# Patient Record
Sex: FEMALE | Race: BLACK OR AFRICAN AMERICAN | NOT HISPANIC OR LATINO
[De-identification: names, ages, dates, MRNs, and addresses within clinical notes are randomized per-mention and may not be internally consistent; named-entity substitution may affect disease eponyms.]

---

## 2021-06-30 ENCOUNTER — NON-APPOINTMENT (OUTPATIENT)
Age: 56
End: 2021-06-30

## 2021-06-30 PROBLEM — Z00.00 ENCOUNTER FOR PREVENTIVE HEALTH EXAMINATION: Status: ACTIVE | Noted: 2021-06-30

## 2021-09-16 ENCOUNTER — APPOINTMENT (OUTPATIENT)
Dept: OBGYN | Facility: CLINIC | Age: 56
End: 2021-09-16
Payer: COMMERCIAL

## 2021-09-16 ENCOUNTER — LABORATORY RESULT (OUTPATIENT)
Age: 56
End: 2021-09-16

## 2021-09-16 VITALS
HEIGHT: 65.5 IN | DIASTOLIC BLOOD PRESSURE: 64 MMHG | RESPIRATION RATE: 16 BRPM | SYSTOLIC BLOOD PRESSURE: 104 MMHG | BODY MASS INDEX: 29.46 KG/M2 | TEMPERATURE: 97.3 F | WEIGHT: 179 LBS

## 2021-09-16 DIAGNOSIS — Z78.9 OTHER SPECIFIED HEALTH STATUS: ICD-10-CM

## 2021-09-16 DIAGNOSIS — Z80.3 FAMILY HISTORY OF MALIGNANT NEOPLASM OF BREAST: ICD-10-CM

## 2021-09-16 DIAGNOSIS — Z78.0 ASYMPTOMATIC MENOPAUSAL STATE: ICD-10-CM

## 2021-09-16 PROCEDURE — 99386 PREV VISIT NEW AGE 40-64: CPT

## 2021-09-16 RX ORDER — CHROMIUM 200 MCG
TABLET ORAL
Refills: 0 | Status: ACTIVE | COMMUNITY

## 2021-09-16 NOTE — LETTER GREETING
[Dear  ___] : Dear  [unfilled], [FreeTextEntry1] : I had the pleasure of evaluating your patient, JOSE TORRES . Please see my summary of recommendations followed by my full note. \par \par Thank you for allowing me to participate in the care of this patient. If you have any questions, please do not hesitate to contact me.\par \par Sincerely,\par \par Samira Neal MD, FACOG \par [facility]\par Obstetrics and Gynecology in [place]\[address], [facility] [address]\[place] [address]\[facility] Phone: [phone] Fax: [phone]

## 2021-09-16 NOTE — HISTORY OF PRESENT ILLNESS
[Patient reported mammogram was normal] : Patient reported mammogram was normal [Patient reported PAP Smear was abnormal] : Patient reported PAP Smear was abnormal [Patient reported colonoscopy was normal] : Patient reported colonoscopy was normal [Y] : Patient is sexually active [Monogamous (Male Partner)] : is monogamous with a male partner [Mammogramdate] : 5/2020 [PapSmeardate] : 7/2020 [TextBox_31] : pt had hysterectomy [ColonoscopyDate] : 8/2020 [TextBox_43] : return in 5 yrs [LMPDate] : 40's [PGxTotal] : 11 [facility] : 1 [FreeTextEntry1] : NSVDx1. Denies cysts, fibroids, hx abnormal pap, denies STI

## 2021-09-16 NOTE — DISCUSSION/SUMMARY
[FreeTextEntry1] : 55 yo here for well woman exam. \par 1) Health maintenance: \par Vaginal Pap + HPV\par Mammogram referral\par Up to date on colonoscopy\par \par 2) s/p hysterectomy for cervical dysplasia:\par Op report and path report requested to review management plan for paps\par \par

## 2021-09-16 NOTE — PHYSICAL EXAM
[Appropriately responsive] : appropriately responsive [Alert] : alert [No Acute Distress] : no acute distress [No Lymphadenopathy] : no lymphadenopathy [Soft] : soft [Non-tender] : non-tender [Non-distended] : non-distended [No HSM] : No HSM [No Lesions] : no lesions [No Mass] : no mass [Oriented x3] : oriented x3 [Examination Of The Breasts] : a normal appearance [No Discharge] : no discharge [No Masses] : no breast masses were palpable [Labia Majora] : normal [Labia Minora] : normal [Normal] : normal [Absent] : absent [Uterine Adnexae] : normal

## 2021-09-16 NOTE — COUNSELING
[Nutrition/ Exercise/ Weight Management] : nutrition, exercise, weight management [Breast Self Exam] : breast self exam [Confidentiality] : confidentiality

## 2021-09-24 ENCOUNTER — NON-APPOINTMENT (OUTPATIENT)
Age: 56
End: 2021-09-24

## 2021-09-27 LAB — HPV HIGH+LOW RISK DNA PNL CVX: DETECTED

## 2021-10-19 ENCOUNTER — OUTPATIENT (OUTPATIENT)
Dept: OUTPATIENT SERVICES | Facility: HOSPITAL | Age: 56
LOS: 1 days | End: 2021-10-19
Payer: COMMERCIAL

## 2021-10-19 ENCOUNTER — APPOINTMENT (OUTPATIENT)
Dept: MAMMOGRAPHY | Facility: CLINIC | Age: 56
End: 2021-10-19
Payer: COMMERCIAL

## 2021-10-19 ENCOUNTER — RESULT REVIEW (OUTPATIENT)
Age: 56
End: 2021-10-19

## 2021-10-19 DIAGNOSIS — Z12.39 ENCOUNTER FOR OTHER SCREENING FOR MALIGNANT NEOPLASM OF BREAST: ICD-10-CM

## 2021-10-19 PROCEDURE — 77067 SCR MAMMO BI INCL CAD: CPT | Mod: 26

## 2021-10-19 PROCEDURE — 77063 BREAST TOMOSYNTHESIS BI: CPT | Mod: 26

## 2021-10-19 PROCEDURE — 77067 SCR MAMMO BI INCL CAD: CPT

## 2021-10-19 PROCEDURE — 77063 BREAST TOMOSYNTHESIS BI: CPT

## 2021-10-20 ENCOUNTER — TRANSCRIPTION ENCOUNTER (OUTPATIENT)
Age: 56
End: 2021-10-20

## 2021-12-10 ENCOUNTER — NON-APPOINTMENT (OUTPATIENT)
Age: 56
End: 2021-12-10

## 2022-03-17 ENCOUNTER — APPOINTMENT (OUTPATIENT)
Dept: OBGYN | Facility: CLINIC | Age: 57
End: 2022-03-17
Payer: COMMERCIAL

## 2022-03-17 ENCOUNTER — LABORATORY RESULT (OUTPATIENT)
Age: 57
End: 2022-03-17

## 2022-03-17 VITALS
SYSTOLIC BLOOD PRESSURE: 110 MMHG | HEIGHT: 65.5 IN | RESPIRATION RATE: 16 BRPM | WEIGHT: 172 LBS | DIASTOLIC BLOOD PRESSURE: 64 MMHG | BODY MASS INDEX: 28.31 KG/M2

## 2022-03-17 DIAGNOSIS — R87.810 CERVICAL HIGH RISK HUMAN PAPILLOMAVIRUS (HPV) DNA TEST POSITIVE: ICD-10-CM

## 2022-03-17 PROCEDURE — 57420 EXAM OF VAGINA W/SCOPE: CPT

## 2022-03-17 NOTE — HISTORY OF PRESENT ILLNESS
[FreeTextEntry1] : Pt here for colposcopy. 9/2021 Pap LGSIL cannot r/o HSIL in background of atrophic vaginitis. HPV positive, 16/18/45 negative. \par \par Hx of robotic hysterectomy 2020 for cervical dysplasia, prior records received. \par \par Mammogram negative. \par

## 2022-03-17 NOTE — DISCUSSION/SUMMARY
[FreeTextEntry1] : 55 yo with hx of cervical dysplasia s/p robotic hysterectomy 2020 now with abnormal vaginal pap 9/2021, now s/p colposcopy. \par -Repeat vaginal pap + HPV given interval from prior pap\par -Vaginal cuff biopsy to pathology\par -Pelvic rest x 2 wks\par -Return in 3 wks for follow-up/results

## 2022-03-17 NOTE — PROCEDURE
[Colposcopy] : Colposcopy  [Time out performed] : Pre-procedure time out performed.  Patient's name, date of birth and procedure confirmed. [Consent Obtained] : Consent obtained [Risks] : risks [Benefits] : benefits [Alternatives] : alternatives [Infection] : infection [Bleeding] : bleeding [Allergic Reaction] : allergic reaction [No Premedication] : no premedication [Pap Performed] : pap performed [No Abnormalities] : no abnormalities [Lesion] : lesion seen [Biopsy] : biopsy taken [Hemostasis Obtained] : Hemostasis obtained [Tolerated Well] : the patient tolerated the procedure well [de-identified] : LSIL cannot r/o HSIL [de-identified] : Repeat vaginal pap/HPV performed [de-identified] : faint AW lesion vaginal cuff midline [de-identified] : vaiginal cuff biopsy [de-identified] : cervix absent, pt s/p hysterectomy [de-identified] : silver nitrate [de-identified] : normal

## 2022-04-07 ENCOUNTER — APPOINTMENT (OUTPATIENT)
Dept: OBGYN | Facility: CLINIC | Age: 57
End: 2022-04-07
Payer: COMMERCIAL

## 2022-04-07 DIAGNOSIS — R87.622 LOW GRADE SQUAMOUS INTRAEPITHELIAL LESION ON CYTOLOGIC SMEAR OF VAGINA (LGSIL): ICD-10-CM

## 2022-04-07 PROCEDURE — 99213 OFFICE O/P EST LOW 20 MIN: CPT | Mod: 95

## 2022-04-08 PROBLEM — R87.622 PAP SMEAR ABNORMALITY OF VAGINA WITH LGSIL: Status: ACTIVE | Noted: 2022-03-17

## 2022-04-08 LAB — CYTOLOGY CVX/VAG DOC THIN PREP: ABNORMAL

## 2022-04-08 NOTE — DISCUSSION/SUMMARY
[FreeTextEntry1] : 57 yo with hx of CIN3 s/p robotic hysterectomy/BSO with persistent abnormal vaginal paps, recent colposcopy with bx insufficient. \par -Recommend referral to GYN Oncology for further evaluation and recommendations\par -She is considering her options and let us know what she decides

## 2022-04-08 NOTE — HISTORY OF PRESENT ILLNESS
[FreeTextEntry1] : Pt here for follow-up to review her results. 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 \par Prior records reviewd, hx of LEEP for CIN3, continued to have persistent high grade paps with no residual cervix. Underwent robot-assisted TLH/BSO 7/2020 with final pathology without any residual dysplasia. \par \par She continues to have abnormal high grade paps. I discussed recommendation for GYN Oncology for further evaluation. She declines at this time. She would like to review her findings with a family member and then will make her decision.

## 2022-04-08 NOTE — REASON FOR VISIT
[Other Location: e.g. School (Enter Location, City,State)___] : at [unfilled], at the time of the visit. [Medical Office: ([facility])___] : at the medical office located in  [Verbal consent obtained from patient] : the patient, [unfilled]

## 2022-04-10 LAB
CORE LAB BIOPSY: NORMAL
HPV HIGH+LOW RISK DNA PNL CVX: DETECTED

## 2023-07-14 ENCOUNTER — NON-APPOINTMENT (OUTPATIENT)
Age: 58
End: 2023-07-14

## 2023-08-07 ENCOUNTER — LABORATORY RESULT (OUTPATIENT)
Age: 58
End: 2023-08-07

## 2023-08-07 ENCOUNTER — APPOINTMENT (OUTPATIENT)
Dept: OBGYN | Facility: CLINIC | Age: 58
End: 2023-08-07
Payer: COMMERCIAL

## 2023-08-07 VITALS
BODY MASS INDEX: 29.99 KG/M2 | HEIGHT: 65 IN | SYSTOLIC BLOOD PRESSURE: 120 MMHG | WEIGHT: 180 LBS | DIASTOLIC BLOOD PRESSURE: 78 MMHG

## 2023-08-07 DIAGNOSIS — R85.611 ATYPICAL SQUAMOUS CELLS CANNOT EXCLUDE HIGH GRADE SQUAMOUS INTRAEPITHELIAL LESION ON CYTOLOGIC SMEAR OF ANUS (ASC-H): ICD-10-CM

## 2023-08-07 DIAGNOSIS — Z01.419 ENCOUNTER FOR GYNECOLOGICAL EXAMINATION (GENERAL) (ROUTINE) W/OUT ABNORMAL FINDINGS: ICD-10-CM

## 2023-08-07 DIAGNOSIS — Z12.39 ENCOUNTER FOR OTHER SCREENING FOR MALIGNANT NEOPLASM OF BREAST: ICD-10-CM

## 2023-08-07 PROCEDURE — 99396 PREV VISIT EST AGE 40-64: CPT

## 2023-08-07 NOTE — HISTORY OF PRESENT ILLNESS
[Patient reported mammogram was normal] : Patient reported mammogram was normal [Patient reported PAP Smear was abnormal] : Patient reported PAP Smear was abnormal [Patient reported colonoscopy was normal] : Patient reported colonoscopy was normal [Y] : Patient is sexually active [Monogamous (Male Partner)] : is monogamous with a male partner [TextBox_4] : 57 yo here for well woman exam. No complaints.  s/p hysterectomy Hx abnormal pap, last colposcopy 2022 vaginal bx with no epithelium. At that time, recommended GYN oncology consultation. Pt states she forgot. Discussed will do vaginal pap today and make management plan based on results.   [Mammogramdate] : 2022 [PapSmeardate] : 7/2020 [TextBox_31] : pt had hysterectomy [ColonoscopyDate] : 8/2020 [TextBox_43] : return in 5 yrs [LMPDate] : 40's [PGxTotal] : 11 [[facility]xF[facility]] : 1 [[facility] Corporation[facility]] : 1 [FreeTextEntry1] : NSVDx1. Denies cysts, fibroids, hx abnormal pap, denies STI

## 2023-08-07 NOTE — DISCUSSION/SUMMARY
[FreeTextEntry1] : 59 yo here for well woman exam.  1) Health maintenance:  Vaginal Pap + HPV Mammogram referral Up to date on colonoscopy  2) s/p hysterectomy for cervical dysplasia, last pap ASCH vaginal bx on colpo insufficient Pap + HPV today Will contact with results Management dependent on results

## 2023-08-07 NOTE — PHYSICAL EXAM
[Chaperone Present] : A chaperone was present in the examining room during all aspects of the physical examination [FreeTextEntry1] : RODRIGO Hernandez  [Appropriately responsive] : appropriately responsive [Alert] : alert [No Acute Distress] : no acute distress [Examination Of The Breasts] : a normal appearance [No Discharge] : no discharge [No Masses] : no breast masses were palpable [Labia Majora] : normal [Labia Minora] : normal [Normal] : normal [Absent] : absent [Uterine Adnexae] : normal

## 2023-08-10 LAB
CYTOLOGY CVX/VAG DOC THIN PREP: ABNORMAL
HPV HIGH+LOW RISK DNA PNL CVX: DETECTED

## 2023-08-23 RX ORDER — ACETAMINOPHEN 325 MG
TABLET ORAL
Refills: 0 | Status: ACTIVE | COMMUNITY

## 2023-08-23 RX ORDER — SPIRONOLACTONE 50 MG/1
TABLET ORAL
Refills: 0 | Status: ACTIVE | COMMUNITY

## 2023-08-23 RX ORDER — TRETINOIN 1 MG/G
CREAM TOPICAL
Refills: 0 | Status: DISCONTINUED | COMMUNITY
End: 2023-08-23

## 2023-08-24 ENCOUNTER — APPOINTMENT (OUTPATIENT)
Dept: GYNECOLOGIC ONCOLOGY | Facility: CLINIC | Age: 58
End: 2023-08-24
Payer: COMMERCIAL

## 2023-08-24 PROCEDURE — 99214 OFFICE O/P EST MOD 30 MIN: CPT | Mod: 25

## 2023-08-24 PROCEDURE — 57455 BIOPSY OF CERVIX W/SCOPE: CPT | Mod: 59

## 2023-08-24 PROCEDURE — 99204 OFFICE O/P NEW MOD 45 MIN: CPT | Mod: 25

## 2023-08-24 NOTE — PROCEDURE
[Colposcopy] : colposcopy [Abnormal Pap] : an abnormal pap smear [Patient] : the patient [Verbal Consent] : verbal consent was obtained prior to the procedure and is detailed in the patient's record [No Abnormalities] : no abnormalities [Biopsy Locations ___ o'clock] : the biopsies were taken at the [unfilled] o'clock position [Silver Nitrate] : silver nitrate [No Complications] : none [Tolerated Well] : the patient tolerated the procedure well [FreeTextEntry3] : acetowhite changes noted along vaginal cuff

## 2023-08-24 NOTE — ASSESSMENT
[FreeTextEntry1] : 59 y/o female with HPV+, 16/18/45 negative, ASCUS-H vaginal PAP. Vaginal colposcopy was done today with 9 oclock vaginal cuff biopsy due to friable tissue and acetowhite changes noted. I discussed with the patient that depending on biopsy results she may be a candidate for laser ablation therapy vs. repeat PAP in 1 year.

## 2023-08-24 NOTE — HISTORY OF PRESENT ILLNESS
[FreeTextEntry1] : 59 y/o  via  female, LMP 10 years ago being referred by Dr. Neal for evaluation of abnormal vaginal PAP. 8/10/23 PAP HPV +, 16/18/45 negative, ASC-H. Patient underwent colposcopy in  vaginal biopsy with no epithelium for HPV+, ASCH PAP. She is  RA [facility] BSO for cervical dysplasia 2020. Denies abnormal vaginal bleeding/discharge, abdominal pain/bloating/distension, pelvis discomfort, changes in normal bowel/urinary habits, nausea/vomiting, unintentional weight loss/gain, and all other associated signs and symptoms. She is not currently sexually active but when she previously was denies pain and/or post coital bleeding.     Health Screening: Last Mammogram: 2023; results pending as per patient Last Colonoscopy: ; normal as per patient  Last Dexa: Patient has not yet had

## 2023-08-24 NOTE — END OF VISIT
[FreeTextEntry3] : This note accurately reflects the work and decisions made by me. Written by Sue Holt PA-C acting as a scribe for Dr. Alfredo Naik.

## 2023-08-24 NOTE — CHIEF COMPLAINT
[FreeTextEntry1] : [place] Office  [facility] Gynecologic [facility] [phone] at [place] [phone]

## 2023-09-08 ENCOUNTER — APPOINTMENT (OUTPATIENT)
Dept: GYNECOLOGIC ONCOLOGY | Facility: CLINIC | Age: 58
End: 2023-09-08
Payer: COMMERCIAL

## 2023-09-08 DIAGNOSIS — R87.811 ATYPICAL SQUAMOUS CELLS OF UNDETERMINED SIGNIFICANCE ON CYTOLOGIC SMEAR OF VAGINA (ASC-US): ICD-10-CM

## 2023-09-08 DIAGNOSIS — R87.620 ATYPICAL SQUAMOUS CELLS OF UNDETERMINED SIGNIFICANCE ON CYTOLOGIC SMEAR OF VAGINA (ASC-US): ICD-10-CM

## 2023-09-08 DIAGNOSIS — D07.2 CARCINOMA IN SITU OF VAGINA: ICD-10-CM

## 2023-09-08 PROCEDURE — 99214 OFFICE O/P EST MOD 30 MIN: CPT | Mod: 95

## 2023-09-08 NOTE — ASSESSMENT
[FreeTextEntry1] : Pt is a 59 yo with ASC-H PAP s/p colposcopy and biopsy and she presents for results. Vaginal biopsy with VAIN3. Recommendation for exam under anesthesia, wide local excision, and laser ablation.   We discussed risk of surgery including but not limited to bleeding, infection, pain, injury to the bladder and bowel. Need for repeat procedure in the future. Recovery is 2 weeks with nothing in the vagina and discharge is expected, but not heavy bleeding. She will get clearance from her PCP and get presugical testing.

## 2023-09-08 NOTE — END OF VISIT
[FreeTextEntry3] : Exam under anesthesia, CO2 laser ablation, WLE of vagina PCP, Presurgical testing, CBC, BMP, ECG [Time Spent: ___ minutes] : I have spent [unfilled] minutes of time on the encounter.

## 2023-09-08 NOTE — HISTORY OF PRESENT ILLNESS
[Home] : at home, [unfilled] , at the time of the visit. [Other Location: e.g. Home (Enter Location, City,State)___] : at [unfilled] [Verbal consent obtained from patient] : the patient, [unfilled] [FreeTextEntry1] : Pt is a 57 yo with ASC-H PAP s/p colposcopy and biopsy and she presents for results. We need urgent review of results for surgical planning and patient has transportation issues.

## 2023-09-13 LAB — CORE LAB BIOPSY: NORMAL

## 2023-11-15 ENCOUNTER — APPOINTMENT (OUTPATIENT)
Dept: GYNECOLOGIC ONCOLOGY | Facility: CLINIC | Age: 58
End: 2023-11-15
Payer: COMMERCIAL

## 2023-11-15 PROCEDURE — 99212 OFFICE O/P EST SF 10 MIN: CPT

## 2024-05-15 ENCOUNTER — APPOINTMENT (OUTPATIENT)
Dept: GYNECOLOGIC ONCOLOGY | Facility: CLINIC | Age: 59
End: 2024-05-15